# Patient Record
Sex: MALE | Race: WHITE
[De-identification: names, ages, dates, MRNs, and addresses within clinical notes are randomized per-mention and may not be internally consistent; named-entity substitution may affect disease eponyms.]

---

## 2017-07-05 ENCOUNTER — HOSPITAL ENCOUNTER (OUTPATIENT)
Dept: HOSPITAL 47 - RADCTMAIN | Age: 72
Discharge: HOME | End: 2017-07-05
Payer: MEDICARE

## 2017-07-05 DIAGNOSIS — J84.10: Primary | ICD-10-CM

## 2017-07-05 DIAGNOSIS — R59.0: ICD-10-CM

## 2017-07-05 PROCEDURE — 71250 CT THORAX DX C-: CPT

## 2017-07-05 NOTE — CT
EXAMINATION TYPE: CT chest wo con

 

DATE OF EXAM: 7/5/2017

 

COMPARISON: NONE

 

HISTORY: SOB

 

CT DLP: 590 mGycm

 

Unenhanced CT of the chest was performed with lung and mediastinal window settings submitted.  The la
ck of contrast limits evaluation of the vascular, mediastinal and parenchymal structures including th
e upper abdomen.

 

LUNGS: Pulmonary fibrosis seen within the upper mid and lower lung zones greatest at the lung bases. 
Calcified granuloma right upper lobe. No evidence for pulmonary mass or suspicious nodule. No evidenc
e for airspace consolidation. 

 

MEDIASTINUM/ROYCE:  Thoracic aorta is of normal caliber with limited evaluation given lack of contrast
.  The heart is not enlarged. There is AP window adenopathy measuring 1.4 cm short axis. Low right pa
ratracheal adenopathy measures 2.1 cm. Left paratracheal adenopathy measures 1.1 cm. Subcarinal adeno
kinsey measures 2.0 cm.

 

UPPER ABDOMEN:  No significant abnormality is seen.

 

OTHER: No significant other abnormality.

 

IMPRESSION:

 

1.  Angiopathic pulmonary fibrosis.

2. Nonspecific mediastinal adenopathy.

3. Remote granulomatous disease.

## 2018-12-03 ENCOUNTER — HOSPITAL ENCOUNTER (OUTPATIENT)
Dept: HOSPITAL 47 - RADCTMAIN | Age: 73
End: 2018-12-03
Payer: MEDICARE

## 2018-12-03 DIAGNOSIS — I70.0: ICD-10-CM

## 2018-12-03 DIAGNOSIS — R91.8: Primary | ICD-10-CM

## 2018-12-03 PROCEDURE — 71250 CT THORAX DX C-: CPT

## 2018-12-03 NOTE — CT
EXAMINATION TYPE: CT chest wo con

 

DATE OF EXAM: 12/3/2018

 

COMPARISON: 7/5/2017

 

HISTORY: pulmonary fibrosis, lung nodules

 

CT DLP: 628 mGycm.  Automated Exposure Control for Dose Reduction was Utilized.

 

TECHNIQUE:  CT scan of the thorax is performed without IV contrast.

 

FINDINGS:

 

LUNGS: Pulmonary fibrosis seen within the upper mid and lower lung zones greatest at the lung bases. 
Calcified granuloma right upper lobe. No evidence for pulmonary mass or suspicious nodule. No evidenc
e for airspace consolidation. 

 

MEDIASTINUM: Thoracic aorta is of normal caliber with limited evaluation given lack of contrast. The 
heart is not enlarged. There is AP window adenopathy measuring 1.4 cm short axis. Low right paratrach
eal adenopathy measures 2.1 cm. Left paratracheal adenopathy measures 1.1 cm. Subcarinal adenopathy m
easures 2.0 cm a dense coronary artery calcification noted there is atherosclerotic change of the aor
ta.

 

OTHER:  No additional significant abnormality is seen.

 

IMPRESSION: 

1. Findings suggest stable fairly advanced chronic interstitial lung disease. Correlate for idiopathi
c pulmonary fibrosis in the differential diagnosis.

2. Nonspecific intestinal adenopathy stable.

3. Rounded density along the upper margin of the left cardiac border has a rim of calcification appea
rs to be adjacent to or related to the left coronary artery. Although this may be partial volume aver
aging area adenopathy or possibly mediastinal cyst a contrast exam is recommended to exclude a corona
ry artery aneurysm. Finding is stable from the prior exam..

4. On the final image there is atherosclerotic change of the aorta. There is some density along the p
osterior midline which may be related to partial volume averaging and atherosclerotic changes, howeve
r, this finding also is recommended to BE correlated with postcontrast imaging to exclude a chronic d
issection. Findings relayed to the referring physician by telephone.

## 2018-12-07 ENCOUNTER — HOSPITAL ENCOUNTER (OUTPATIENT)
Dept: HOSPITAL 47 - RADCTMAIN | Age: 73
Discharge: HOME | End: 2018-12-07
Attending: INTERNAL MEDICINE
Payer: MEDICARE

## 2018-12-07 DIAGNOSIS — J98.4: Primary | ICD-10-CM

## 2018-12-07 LAB — BUN SERPL-SCNC: 18 MG/DL (ref 9–20)

## 2018-12-07 PROCEDURE — 84520 ASSAY OF UREA NITROGEN: CPT

## 2018-12-07 PROCEDURE — 82565 ASSAY OF CREATININE: CPT

## 2018-12-07 PROCEDURE — 36415 COLL VENOUS BLD VENIPUNCTURE: CPT

## 2018-12-07 PROCEDURE — 71260 CT THORAX DX C+: CPT

## 2018-12-07 NOTE — CT
EXAMINATION TYPE: CT chest w con

 

DATE OF EXAM: 12/7/2018

 

COMPARISON: 12/3/2018

 

HISTORY: Idiopathic pulumonary fibrosis.  Difficulty breathing.

 

CT DLP: 286.9 mGycm, Automated exposure control for dose reduction was used.

 

CONTRAST: Performed injected with 80 mL of Isovue 300.

 

TECHNIQUE: Axial images were obtained at 5 mm thick sections.  Reconstructed images are reviewed on [employer] computer in the coronal plane. 

 

FINDINGS: Portion of the thyroid visualized is normal.

 

Focal consolidation is not identified. There are scattered nonspecific increased lung markings may be
 related to pulmonary fibrosis. Mild peribronchial thickening may be present.

 

There is a peripheral calcification in the right midlung measuring 0.4 cm. Series 4.

 

 There is a large 1.8 cm density in the pretracheal space posterior superior vena cava. Enlarged lymp
h nodes be considered. There are additional smaller aortopulmonic window transverse dimension 1.0 cm.
 Subcentimeter lymph nodes are also at the aortopulmonic window. Enlarged subcarinal lymph nodes are 
present measuring approximately 1.3 cm in diameter. Mild-to-moderate coronary artery calcifications p
resent. The ascending aorta diameter at the level of the main pulmonary artery is 2.7 cm.  The main p
ulmonary artery diameter at the bifurcation is 2.4 cm.

 

Limited CT sections are obtained through the upper abdomen. Abdomen is essentially unremarkable.

 

IMPRESSIONS:

1. Diffuse increased lung markings can be compatible with a radiopaque pulmonary fibrosis.

2. Calcified densities in the posterior right midlung.

3. Enlarged pretracheal and aortopulmonic window lymph nodes.

## 2019-08-21 ENCOUNTER — HOSPITAL ENCOUNTER (INPATIENT)
Dept: HOSPITAL 47 - EC | Age: 74
LOS: 5 days | Discharge: HOME | DRG: 196 | End: 2019-08-26
Attending: FAMILY MEDICINE | Admitting: FAMILY MEDICINE
Payer: MEDICARE

## 2019-08-21 VITALS — BODY MASS INDEX: 23.8 KG/M2

## 2019-08-21 DIAGNOSIS — Z82.49: ICD-10-CM

## 2019-08-21 DIAGNOSIS — I25.10: ICD-10-CM

## 2019-08-21 DIAGNOSIS — Z79.84: ICD-10-CM

## 2019-08-21 DIAGNOSIS — Z87.891: ICD-10-CM

## 2019-08-21 DIAGNOSIS — F51.01: ICD-10-CM

## 2019-08-21 DIAGNOSIS — Z79.82: ICD-10-CM

## 2019-08-21 DIAGNOSIS — G47.30: ICD-10-CM

## 2019-08-21 DIAGNOSIS — J84.112: Primary | ICD-10-CM

## 2019-08-21 DIAGNOSIS — Z95.5: ICD-10-CM

## 2019-08-21 DIAGNOSIS — Z86.718: ICD-10-CM

## 2019-08-21 DIAGNOSIS — J44.1: ICD-10-CM

## 2019-08-21 DIAGNOSIS — Z95.1: ICD-10-CM

## 2019-08-21 DIAGNOSIS — Z79.899: ICD-10-CM

## 2019-08-21 DIAGNOSIS — Z79.02: ICD-10-CM

## 2019-08-21 DIAGNOSIS — J96.21: ICD-10-CM

## 2019-08-21 DIAGNOSIS — Z99.81: ICD-10-CM

## 2019-08-21 DIAGNOSIS — E11.9: ICD-10-CM

## 2019-08-21 DIAGNOSIS — I27.20: ICD-10-CM

## 2019-08-21 LAB
ALBUMIN SERPL-MCNC: 4.2 G/DL (ref 3.5–5)
ALP SERPL-CCNC: 84 U/L (ref 38–126)
ALT SERPL-CCNC: 30 U/L (ref 21–72)
ANION GAP SERPL CALC-SCNC: 12 MMOL/L
APTT BLD: 24.4 SEC (ref 22–30)
AST SERPL-CCNC: 25 U/L (ref 17–59)
BASOPHILS # BLD AUTO: 0 K/UL (ref 0–0.2)
BASOPHILS NFR BLD AUTO: 1 %
BUN SERPL-SCNC: 23 MG/DL (ref 9–20)
CALCIUM SPEC-MCNC: 9.8 MG/DL (ref 8.4–10.2)
CHLORIDE SERPL-SCNC: 104 MMOL/L (ref 98–107)
CO2 SERPL-SCNC: 24 MMOL/L (ref 22–30)
EOSINOPHIL # BLD AUTO: 0.1 K/UL (ref 0–0.7)
EOSINOPHIL NFR BLD AUTO: 2 %
ERYTHROCYTE [DISTWIDTH] IN BLOOD BY AUTOMATED COUNT: 4.87 M/UL (ref 4.3–5.9)
ERYTHROCYTE [DISTWIDTH] IN BLOOD: 15.9 % (ref 11.5–15.5)
GLUCOSE SERPL-MCNC: 95 MG/DL (ref 74–99)
HCT VFR BLD AUTO: 47.2 % (ref 39–53)
HGB BLD-MCNC: 15.8 GM/DL (ref 13–17.5)
INR PPP: 1 (ref ?–1.2)
LYMPHOCYTES # SPEC AUTO: 1.1 K/UL (ref 1–4.8)
LYMPHOCYTES NFR SPEC AUTO: 16 %
MAGNESIUM SPEC-SCNC: 2.2 MG/DL (ref 1.6–2.3)
MCH RBC QN AUTO: 32.4 PG (ref 25–35)
MCHC RBC AUTO-ENTMCNC: 33.5 G/DL (ref 31–37)
MCV RBC AUTO: 96.8 FL (ref 80–100)
MONOCYTES # BLD AUTO: 0.4 K/UL (ref 0–1)
MONOCYTES NFR BLD AUTO: 6 %
NEUTROPHILS # BLD AUTO: 5 K/UL (ref 1.3–7.7)
NEUTROPHILS NFR BLD AUTO: 75 %
PLATELET # BLD AUTO: 158 K/UL (ref 150–450)
POTASSIUM SERPL-SCNC: 4.4 MMOL/L (ref 3.5–5.1)
PROT SERPL-MCNC: 7.7 G/DL (ref 6.3–8.2)
PT BLD: 10.7 SEC (ref 9–12)
SODIUM SERPL-SCNC: 140 MMOL/L (ref 137–145)
WBC # BLD AUTO: 6.6 K/UL (ref 3.8–10.6)

## 2019-08-21 PROCEDURE — 83880 ASSAY OF NATRIURETIC PEPTIDE: CPT

## 2019-08-21 PROCEDURE — 71045 X-RAY EXAM CHEST 1 VIEW: CPT

## 2019-08-21 PROCEDURE — 96361 HYDRATE IV INFUSION ADD-ON: CPT

## 2019-08-21 PROCEDURE — 83036 HEMOGLOBIN GLYCOSYLATED A1C: CPT

## 2019-08-21 PROCEDURE — 80048 BASIC METABOLIC PNL TOTAL CA: CPT

## 2019-08-21 PROCEDURE — 80053 COMPREHEN METABOLIC PANEL: CPT

## 2019-08-21 PROCEDURE — 96374 THER/PROPH/DIAG INJ IV PUSH: CPT

## 2019-08-21 PROCEDURE — 36415 COLL VENOUS BLD VENIPUNCTURE: CPT

## 2019-08-21 PROCEDURE — 85025 COMPLETE CBC W/AUTO DIFF WBC: CPT

## 2019-08-21 PROCEDURE — 93005 ELECTROCARDIOGRAM TRACING: CPT

## 2019-08-21 PROCEDURE — 83605 ASSAY OF LACTIC ACID: CPT

## 2019-08-21 PROCEDURE — 94640 AIRWAY INHALATION TREATMENT: CPT

## 2019-08-21 PROCEDURE — 94760 N-INVAS EAR/PLS OXIMETRY 1: CPT

## 2019-08-21 PROCEDURE — 71250 CT THORAX DX C-: CPT

## 2019-08-21 PROCEDURE — 85610 PROTHROMBIN TIME: CPT

## 2019-08-21 PROCEDURE — 84484 ASSAY OF TROPONIN QUANT: CPT

## 2019-08-21 PROCEDURE — 83735 ASSAY OF MAGNESIUM: CPT

## 2019-08-21 PROCEDURE — 85730 THROMBOPLASTIN TIME PARTIAL: CPT

## 2019-08-21 PROCEDURE — 99285 EMERGENCY DEPT VISIT HI MDM: CPT

## 2019-08-21 RX ADMIN — CLOPIDOGREL BISULFATE SCH MG: 75 TABLET ORAL at 22:40

## 2019-08-21 RX ADMIN — IPRATROPIUM BROMIDE AND ALBUTEROL SULFATE SCH: .5; 3 SOLUTION RESPIRATORY (INHALATION) at 20:32

## 2019-08-21 RX ADMIN — CEFAZOLIN SCH MLS/HR: 330 INJECTION, POWDER, FOR SOLUTION INTRAMUSCULAR; INTRAVENOUS at 21:01

## 2019-08-21 RX ADMIN — ATORVASTATIN CALCIUM SCH MG: 40 TABLET, FILM COATED ORAL at 22:40

## 2019-08-21 RX ADMIN — METHYLPREDNISOLONE SODIUM SUCCINATE SCH MG: 125 INJECTION, POWDER, FOR SOLUTION INTRAMUSCULAR; INTRAVENOUS at 23:10

## 2019-08-21 NOTE — ED
SOB HPI





- General


Chief Complaint: Shortness of Breath


Stated Complaint: Weakness, Low O2


Time Seen by Provider: 08/21/19 15:16


Source: patient, RN notes reviewed, old records reviewed


Mode of arrival: wheelchair


Limitations: no limitations





- History of Present Illness


Initial Comments: 





This is a 74-year-old male the ER for evaluation.  Patient has severe history of

pulmonary fibrosis and severe lung disease is followed at [facility].  As well as of been running low lately symptoms in the 70s.  Patient 

denies any pain with sitting still he feels fine, went to get around or do any 

activity he gets severely short of breath.  Patient cannot do much activity at 

home with activities of daily living or shortly diminished.  Patient denying 

current chest pain no fevers.  Mild cough significant congestion.  No travel 

history no sick contacts


MD Complaint: shortness of breath, cough


-: days(s)


Severity: moderate


Severity scale (1-10): 4


Quality: aching


Consistency: constant


Improves With: oxygen, rest, bronchodilators, medication


Worsens With: exertion


Known History Of: COPD (Pulmonary fibrosis)


Context: recent URI


Associated Symptoms: cough


Treatments Prior to Arrival: none





- Related Data


                                Home Medications











 Medication  Instructions  Recorded  Confirmed


 


Aspirin 81 mg PO DAILY 08/05/16 08/21/19


 


Atorvastatin [Lipitor] 40 mg PO DAILY 08/05/16 08/21/19


 


Clopidogrel [Plavix] 75 mg PO DAILY 08/05/16 08/21/19


 


Glimepiride [Amaryl] 4 mg PO BID 08/05/16 08/21/19


 


Isosorbide Mononitrate [Imdur] 120 mg PO DAILY 08/05/16 08/21/19


 


Ranolazine [Ranexa] 1,000 mg PO BID 08/05/16 08/21/19


 


sitaGLIPtin [Januvia] 100 mg PO DAILY 08/05/16 08/21/19


 


Fluticasone Nasal Spray [Flonase 2 spr EA NOSTRIL DAILY 08/21/19 08/21/19





Nasal Spray]   


 


Midodrine [ProAmatine] 5 mg PO BID 08/21/19 08/21/19


 


Natures Bounty Cinnimon Plus 1 cap PO DAILY 08/21/19 08/21/19





Chromium 2000mg   











                                    Allergies











Allergy/AdvReac Type Severity Reaction Status Date / Time


 


No Known Allergies Allergy   Verified 08/21/19 15:51














Review of Systems


ROS Statement: 


Those systems with pertinent positive or pertinent negative responses have been 

documented in the HPI.





ROS Other: All systems not noted in ROS Statement are negative.





Past Medical History


Past Medical History: Diabetes Mellitus, Deep Vein Thrombosis (DVT), Sleep 

Apnea/CPAP/BIPAP


Additional Past Medical History / Comment(s): see Dr Jaime H & P, supposed to 

use CPAP, DVT years ago after a surgery, loses balance & gets dizzy,pulmonary 

fibrosis ,pulmonary htn


History of Any Multi-Drug Resistant Organisms: None Reported


Past Surgical History: Appendectomy, Coronary Bypass/CABG, Heart Catheterization

 With Stent, Orthopedic Surgery


Additional Past Surgical History / Comment(s): CABG x2, has 13 stents, finger 

surg.


Past Anesthesia/Blood Transfusion Reactions: No Reported Reaction


Date of Last Stent Placement:: 2014


Past Psychological History: No Psychological Hx Reported


Smoking Status: Former smoker


Past Alcohol Use History: None Reported


Past Drug Use History: None Reported





- Past Family History


  ** Mother


Family Medical History: No Reported History





General Exam


Limitations: no limitations


General appearance: alert, in no apparent distress


Head exam: Present: atraumatic, normocephalic, normal inspection


Eye exam: Present: normal appearance, PERRL, EOMI.  Absent: scleral icterus, 

conjunctival injection, periorbital swelling


ENT exam: Present: normal exam, mucous membranes moist


Neck exam: Present: normal inspection.  Absent: tenderness, meningismus, 

lymphadenopathy


Respiratory exam: Present: wheezes, decreased breath sounds, prolonged 

expiratory.  Absent: respiratory distress, rales, rhonchi, stridor


Cardiovascular Exam: Present: regular rate, normal rhythm, normal heart sounds. 

 Absent: systolic murmur, diastolic murmur, rubs, gallop, clicks


GI/Abdominal exam: Present: soft, normal bowel sounds.  Absent: distended, 

tenderness, guarding, rebound, rigid


Extremities exam: Present: normal inspection, full ROM, normal capillary refill.

  Absent: tenderness, pedal edema, joint swelling, calf tenderness


Back exam: Present: normal inspection


Neurological exam: Present: alert, oriented X3, CN II-XII intact


Psychiatric exam: Present: normal affect, normal mood


Skin exam: Present: warm, dry, intact, normal color.  Absent: rash





Course


                                   Vital Signs











  08/21/19 08/21/19 08/21/19





  15:05 15:51 16:01


 


Temperature 97.9 F  


 


Pulse Rate 76 60 67


 


Respiratory 26 H  





Rate   


 


Blood Pressure 138/77  


 


O2 Sat by Pulse 82 L  





Oximetry   














  08/21/19





  16:31


 


Temperature 


 


Pulse Rate 78


 


Respiratory 





Rate 


 


Blood Pressure 


 


O2 Sat by Pulse 





Oximetry 














- Reevaluation(s)


Reevaluation #1: 





08/21/19 18:01


Medical records reviewed


Reevaluation #2: 





08/21/19 18:01


Patient states she is without significant complaint





Medical Decision Making





- Medical Decision Making





74 male the ER for evaluation presents today for evaluation regards to breath 

hypoxia home.  Patient feels better here in the ER with exertion symptoms 

significantly increased.  Patient be admitted for continued breathing treatments





- Lab Data


Result diagrams: 


                                 08/21/19 15:40





                                 08/21/19 15:40


                                   Lab Results











  08/21/19 08/21/19 08/21/19 Range/Units





  15:40 15:40 15:40 


 


WBC  6.6    (3.8-10.6)  k/uL


 


RBC  4.87    (4.30-5.90)  m/uL


 


Hgb  15.8    (13.0-17.5)  gm/dL


 


Hct  47.2    (39.0-53.0)  %


 


MCV  96.8    (80.0-100.0)  fL


 


MCH  32.4    (25.0-35.0)  pg


 


MCHC  33.5    (31.0-37.0)  g/dL


 


RDW  15.9 H    (11.5-15.5)  %


 


Plt Count  158    (150-450)  k/uL


 


Neutrophils %  75    %


 


Lymphocytes %  16    %


 


Monocytes %  6    %


 


Eosinophils %  2    %


 


Basophils %  1    %


 


Neutrophils #  5.0    (1.3-7.7)  k/uL


 


Lymphocytes #  1.1    (1.0-4.8)  k/uL


 


Monocytes #  0.4    (0-1.0)  k/uL


 


Eosinophils #  0.1    (0-0.7)  k/uL


 


Basophils #  0.0    (0-0.2)  k/uL


 


PT    10.7  (9.0-12.0)  sec


 


INR    1.0  (<1.2)  


 


APTT    24.4  (22.0-30.0)  sec


 


Sodium   140   (137-145)  mmol/L


 


Potassium   4.4   (3.5-5.1)  mmol/L


 


Chloride   104   ()  mmol/L


 


Carbon Dioxide   24   (22-30)  mmol/L


 


Anion Gap   12   mmol/L


 


BUN   23 H   (9-20)  mg/dL


 


Creatinine   1.18   (0.66-1.25)  mg/dL


 


Est GFR (CKD-EPI)AfAm   69   (>60 ml/min/1.73 sqM)  


 


Est GFR (CKD-EPI)NonAf   60   (>60 ml/min/1.73 sqM)  


 


Glucose   95   (74-99)  mg/dL


 


Calcium   9.8   (8.4-10.2)  mg/dL


 


Magnesium   2.2   (1.6-2.3)  mg/dL


 


Total Bilirubin   0.9   (0.2-1.3)  mg/dL


 


AST   25   (17-59)  U/L


 


ALT   30   (21-72)  U/L


 


Alkaline Phosphatase   84   ()  U/L


 


Troponin I     (0.000-0.034)  ng/mL


 


Total Protein   7.7   (6.3-8.2)  g/dL


 


Albumin   4.2   (3.5-5.0)  g/dL














  08/21/19 Range/Units





  15:40 


 


WBC   (3.8-10.6)  k/uL


 


RBC   (4.30-5.90)  m/uL


 


Hgb   (13.0-17.5)  gm/dL


 


Hct   (39.0-53.0)  %


 


MCV   (80.0-100.0)  fL


 


MCH   (25.0-35.0)  pg


 


MCHC   (31.0-37.0)  g/dL


 


RDW   (11.5-15.5)  %


 


Plt Count   (150-450)  k/uL


 


Neutrophils %   %


 


Lymphocytes %   %


 


Monocytes %   %


 


Eosinophils %   %


 


Basophils %   %


 


Neutrophils #   (1.3-7.7)  k/uL


 


Lymphocytes #   (1.0-4.8)  k/uL


 


Monocytes #   (0-1.0)  k/uL


 


Eosinophils #   (0-0.7)  k/uL


 


Basophils #   (0-0.2)  k/uL


 


PT   (9.0-12.0)  sec


 


INR   (<1.2)  


 


APTT   (22.0-30.0)  sec


 


Sodium   (137-145)  mmol/L


 


Potassium   (3.5-5.1)  mmol/L


 


Chloride   ()  mmol/L


 


Carbon Dioxide   (22-30)  mmol/L


 


Anion Gap   mmol/L


 


BUN   (9-20)  mg/dL


 


Creatinine   (0.66-1.25)  mg/dL


 


Est GFR (CKD-EPI)AfAm   (>60 ml/min/1.73 sqM)  


 


Est GFR (CKD-EPI)NonAf   (>60 ml/min/1.73 sqM)  


 


Glucose   (74-99)  mg/dL


 


Calcium   (8.4-10.2)  mg/dL


 


Magnesium   (1.6-2.3)  mg/dL


 


Total Bilirubin   (0.2-1.3)  mg/dL


 


AST   (17-59)  U/L


 


ALT   (21-72)  U/L


 


Alkaline Phosphatase   ()  U/L


 


Troponin I  <0.012  (0.000-0.034)  ng/mL


 


Total Protein   (6.3-8.2)  g/dL


 


Albumin   (3.5-5.0)  g/dL














- EKG Data


-: EKG Interpreted by Me (EKG shows sinus rhythm rate of 74, , QRS 90, QTc

 452)





- Radiology Data


Radiology results: report reviewed (Chest x-rays negative for acute disease), 

image reviewed





Disposition


Clinical Impression: 


 Acute exacerbation of chronic obstructive airways disease, Pulmonary fibrosis





Disposition: ADMITTED AS IP TO THIS HOSP


Condition: Fair


Is patient prescribed a controlled substance at d/c from ED?: No


Referrals: 


René Gong MD [Primary Care Provider] - 1-2 days

## 2019-08-21 NOTE — XR
EXAMINATION TYPE: XR chest 1V portable

 

DATE OF EXAM: 8/21/2019

 

HISTORY: Shortness of breath.

 

COMPARISON: None.

 

TECHNIQUE: Single view of the chest is submitted.

 

FINDINGS:

Demonstrated are scattered senescent parenchymal change.  Scattered fibrotic change noted.

 

There is no evidence for focal infiltrate. 

 

The heart is  is enlarged.

 

Hilar and mediastinal structures are within normal limits.  

 

Degenerative changes are seen of the dorsal spine. 

 

 IMPRESSION: 

 

1.  Chronic changes without evidence for acute pulmonary disease.

## 2019-08-22 LAB
GLUCOSE BLD-MCNC: 208 MG/DL (ref 75–99)
GLUCOSE BLD-MCNC: 233 MG/DL (ref 75–99)
GLUCOSE BLD-MCNC: 300 MG/DL (ref 75–99)
HBA1C MFR BLD: 6.1 % (ref 4–6)

## 2019-08-22 RX ADMIN — GLIMEPIRIDE SCH MG: 4 TABLET ORAL at 20:22

## 2019-08-22 RX ADMIN — INSULIN ASPART SCH UNIT: 100 INJECTION, SOLUTION INTRAVENOUS; SUBCUTANEOUS at 18:04

## 2019-08-22 RX ADMIN — LINAGLIPTIN SCH MG: 5 TABLET, FILM COATED ORAL at 09:01

## 2019-08-22 RX ADMIN — NITROGLYCERIN PRN MG: 0.4 TABLET SUBLINGUAL at 14:20

## 2019-08-22 RX ADMIN — ISOSORBIDE MONONITRATE SCH MG: 60 TABLET, EXTENDED RELEASE ORAL at 09:00

## 2019-08-22 RX ADMIN — ASPIRIN 81 MG CHEWABLE TABLET SCH MG: 81 TABLET CHEWABLE at 09:01

## 2019-08-22 RX ADMIN — RANOLAZINE SCH MG: 500 TABLET, FILM COATED, EXTENDED RELEASE ORAL at 20:23

## 2019-08-22 RX ADMIN — METHYLPREDNISOLONE SODIUM SUCCINATE SCH MG: 125 INJECTION, POWDER, FOR SOLUTION INTRAMUSCULAR; INTRAVENOUS at 17:57

## 2019-08-22 RX ADMIN — MIDODRINE HYDROCHLORIDE SCH MG: 5 TABLET ORAL at 09:01

## 2019-08-22 RX ADMIN — CEFAZOLIN SCH MLS/HR: 330 INJECTION, POWDER, FOR SOLUTION INTRAMUSCULAR; INTRAVENOUS at 15:34

## 2019-08-22 RX ADMIN — MIDODRINE HYDROCHLORIDE SCH MG: 5 TABLET ORAL at 17:58

## 2019-08-22 RX ADMIN — METHYLPREDNISOLONE SODIUM SUCCINATE SCH MG: 125 INJECTION, POWDER, FOR SOLUTION INTRAMUSCULAR; INTRAVENOUS at 06:04

## 2019-08-22 RX ADMIN — NITROGLYCERIN PRN MG: 0.4 TABLET SUBLINGUAL at 14:00

## 2019-08-22 RX ADMIN — CEFAZOLIN SCH MLS/HR: 330 INJECTION, POWDER, FOR SOLUTION INTRAMUSCULAR; INTRAVENOUS at 23:44

## 2019-08-22 RX ADMIN — INSULIN ASPART SCH: 100 INJECTION, SOLUTION INTRAVENOUS; SUBCUTANEOUS at 12:31

## 2019-08-22 RX ADMIN — INSULIN ASPART SCH UNIT: 100 INJECTION, SOLUTION INTRAVENOUS; SUBCUTANEOUS at 20:27

## 2019-08-22 RX ADMIN — RANOLAZINE SCH MG: 500 TABLET, FILM COATED, EXTENDED RELEASE ORAL at 09:01

## 2019-08-22 RX ADMIN — METHYLPREDNISOLONE SODIUM SUCCINATE SCH MG: 125 INJECTION, POWDER, FOR SOLUTION INTRAMUSCULAR; INTRAVENOUS at 23:44

## 2019-08-22 RX ADMIN — BUDESONIDE SCH MG: 0.5 INHALANT ORAL at 20:36

## 2019-08-22 RX ADMIN — IPRATROPIUM BROMIDE AND ALBUTEROL SULFATE SCH ML: .5; 3 SOLUTION RESPIRATORY (INHALATION) at 11:06

## 2019-08-22 RX ADMIN — NITROGLYCERIN PRN MG: 0.4 TABLET SUBLINGUAL at 12:53

## 2019-08-22 RX ADMIN — IPRATROPIUM BROMIDE AND ALBUTEROL SULFATE SCH ML: .5; 3 SOLUTION RESPIRATORY (INHALATION) at 20:36

## 2019-08-22 RX ADMIN — ATORVASTATIN CALCIUM SCH MG: 40 TABLET, FILM COATED ORAL at 20:22

## 2019-08-22 RX ADMIN — CEFAZOLIN SCH MLS/HR: 330 INJECTION, POWDER, FOR SOLUTION INTRAMUSCULAR; INTRAVENOUS at 05:41

## 2019-08-22 RX ADMIN — FLUTICASONE PROPIONATE SCH SPRAY: 50 SPRAY, METERED NASAL at 08:59

## 2019-08-22 RX ADMIN — GLIMEPIRIDE SCH MG: 4 TABLET ORAL at 09:01

## 2019-08-22 RX ADMIN — IPRATROPIUM BROMIDE AND ALBUTEROL SULFATE SCH ML: .5; 3 SOLUTION RESPIRATORY (INHALATION) at 07:02

## 2019-08-22 RX ADMIN — IPRATROPIUM BROMIDE AND ALBUTEROL SULFATE SCH ML: .5; 3 SOLUTION RESPIRATORY (INHALATION) at 15:52

## 2019-08-22 RX ADMIN — CLOPIDOGREL BISULFATE SCH MG: 75 TABLET ORAL at 20:22

## 2019-08-22 NOTE — CT
EXAMINATION TYPE: CT chest wo con

 

DATE OF EXAM: 8/22/2019

 

COMPARISON: 12/7/2018

 

HISTORY: Pulmonary fibrosis.

 

CT DLP: 438 mGycm.  Automated Exposure Control for Dose Reduction was Utilized.

 

TECHNIQUE:  CT scan of the thorax is performed without IV contrast.

 

FINDINGS:

 

There is diffuse reticular peripheral infiltrate in both lungs. Thoracic aorta is atheromatous. There
 is extensive coronary artery calcification. There is no aortic aneurysm. There are no hilar masses. 
There are multiple enlarged mediastinal lymph nodes. Largest measures 2.5 cm. There is no pleural eff
usion. There is no adrenal mass. Upper abdominal soft tissues are intact.

 

There is spurring in the thoracic spine. There is no compression fracture. There are sternal wires. B
claudette thorax is intact.

IMPRESSION: Extensive pulmonary interstitial fibrosis. Mediastinal adenopathy. Lung disease show some
 progression compared to old CT scan. Mediastinal adenopathy unchanged. Extensive atherosclerotic vas
cular disease.

## 2019-08-22 NOTE — P.HPIM
History of Present Illness


H&P Date: 08/22/19


Chief Complaint: Hypoxia





This is a history and physical on a 74-year-old white male with known history of

diabetes who struggles with pulmonary fibrosis.  The patient has been taking 

Esbriet recently but has not been able to tolerate the medication.  He is now 

admitted for significant dyspnea on exertion.  Pulse oximetry would drop into 

the low 80s.  He is now on 4 L and has been placed on COPD type treatment.  No 

sniffing chest pain or nausea area no voiding difficulties are stated.  The 

patient is former smoker.





Review of Systems


Constitutional: Denies chills, Denies fever


Eyes: denies blurred vision, denies pain


Cardiovascular: Denies chest pain, Denies shortness of breath


Respiratory: Reports dyspnea, Reports home oxygen


Gastrointestinal: Denies abdominal pain, Denies diarrhea, Denies nausea, Denies 

vomiting


Musculoskeletal: Denies as per HPI, Denies myalgias


Integumentary: Denies pruritus, Denies rash


Neurological: Denies numbness, Denies weakness


Endocrine: Denies fatigue, Denies weight change





Past Medical History


Past Medical History: Diabetes Mellitus, Deep Vein Thrombosis (DVT), Sleep 

Apnea/CPAP/BIPAP


Additional Past Medical History / Comment(s): supposed to use CPAP, DVT years 

ago after a surgery, loses balance & gets dizzy in past ,pulmonary fibrosis 

,pulmonary htn


History of Any Multi-Drug Resistant Organisms: None Reported


Past Surgical History: Appendectomy, Coronary Bypass/CABG, Heart Catheterization

With Stent, Orthopedic Surgery


Additional Past Surgical History / Comment(s): CABG x2, has 13 stents, finger 

surg right index amputated,


Past Anesthesia/Blood Transfusion Reactions: No Reported Reaction


Date of Last Stent Placement:: 2014


Past Psychological History: No Psychological Hx Reported


Additional Psychological History / Comment(s): Lives with wife. Lives in ranch 

home and one step. Has home oxygen, and glucometer.


Smoking Status: Former smoker


Past Alcohol Use History: Occasional


Additional Past Alcohol Use History / Comment(s): quit smoking 1980, smoked for 

20 yrs.


Past Drug Use History: None Reported





- Past Family History


  ** Mother


Family Medical History: Myocardial Infarction (MI)


Additional Family Medical History / Comment(s): Heart problems





Medications and Allergies


                                Home Medications











 Medication  Instructions  Recorded  Confirmed  Type


 


Aspirin 81 mg PO DAILY 08/05/16 08/21/19 History


 


Atorvastatin [Lipitor] 40 mg PO HS 08/05/16 08/21/19 History


 


Clopidogrel [Plavix] 75 mg PO HS 08/05/16 08/21/19 History


 


Glimepiride [Amaryl] 4 mg PO BID 08/05/16 08/21/19 History


 


Isosorbide Mononitrate [Imdur] 120 mg PO DAILY 08/05/16 08/21/19 History


 


Ranolazine [Ranexa] 1,000 mg PO BID 08/05/16 08/21/19 History


 


sitaGLIPtin [Januvia] 100 mg PO DAILY 08/05/16 08/21/19 History


 


Fluticasone Nasal Spray [Flonase 2 spr EA NOSTRIL DAILY 08/21/19 08/21/19 

History





Nasal Spray]    


 


Midodrine [ProAmatine] 5 mg PO BID 08/21/19 08/21/19 History


 


Natures Bounty Cinnimon Plus 1 cap PO DAILY 08/21/19 08/21/19 History





Chromium 2000mg    








                                    Allergies











Allergy/AdvReac Type Severity Reaction Status Date / Time


 


No Known Allergies Allergy   Verified 08/21/19 15:51














Physical Exam


Vitals: 


                                   Vital Signs











  Temp Pulse Pulse Pulse Resp BP BP


 


 08/22/19 07:14   69    18  


 


 08/22/19 07:02   68     


 


 08/22/19 05:00  97.3 F L   65   18   126/70


 


 08/21/19 21:39  97.5 F L    64  18   142/47


 


 08/21/19 18:19   63    18  161/82 


 


 08/21/19 16:31   78     


 


 08/21/19 16:01   67     


 


 08/21/19 15:51   60     


 


 08/21/19 15:05  97.9 F  76    26 H  138/77 














  Pulse Ox


 


 08/22/19 07:14 


 


 08/22/19 07:02  94 L


 


 08/22/19 05:00  94 L


 


 08/21/19 21:39  93 L


 


 08/21/19 18:19  93 L


 


 08/21/19 16:31 


 


 08/21/19 16:01 


 


 08/21/19 15:51 


 


 08/21/19 15:05  82 L








                                Intake and Output











 08/21/19 08/22/19 08/22/19





 22:59 06:59 14:59


 


Intake Total 240 840 


 


Output Total 800  


 


Balance -560 840 


 


Intake:   


 


  Intake, IV Titration  600 





  Amount   


 


    Sodium Chloride 0.9% 1,  600 





    000 ml @ 100 mls/hr IV .   





    Q10H [facility] Rx#:[id number]   


 


  Oral 240 240 


 


Output:   


 


  Urine 800  


 


Other:   


 


  Voiding Method  Urinal 


 


  # Voids  2 


 


  Weight 69.082 kg  














- Constitutional


General appearance: no acute distress





- EENT


Eyes: EOMI





- Neck


Neck: no lymphadenopathy





- Respiratory


Respiratory: bilateral: diminished





- Cardiovascular


Rhythm: regular


Heart sounds: normal: S1, S2


Abnormal Heart Sounds: no S3 Gallop





- Gastrointestinal


General gastrointestinal: soft, no tenderness





- Neurologic


Neurologic: CNII-XII intact





- Musculoskeletal


Musculoskeletal: generalized weakness





- Psychiatric


Psychiatric: A&O x's 3, appropriate affect, intact judgment & insight





Results


CBC & Chem 7: 


                                 08/21/19 15:40





                                 08/21/19 15:40


Labs: 


                  Abnormal Lab Results - Last 24 Hours (Table)











  08/21/19 08/21/19 Range/Units





  15:40 15:40 


 


RDW  15.9 H   (11.5-15.5)  %


 


BUN   23 H  (9-20)  mg/dL














Thrombosis Risk Factor Assmnt





- Choose All That Apply


Each Factor Represents 1 point: Swollen legs (current)


Each Risk Factor Represents 3 Points: Age 75 years or older


Thrombosis Risk Factor Assessment Total Risk Factor Score: 4


Thrombosis Risk Factor Assessment Level: Moderate Risk





Assessment and Plan


(1) Diabetes


Current Visit: Yes   Status: Acute   Code(s): E11.9 - TYPE 2 DIABETES MELLITUS 

WITHOUT COMPLICATIONS   SNOMED Code(s): 65107587


   





(2) CAD (coronary artery disease)


Current Visit: Yes   Status: Acute   Code(s): I25.10 - ATHSCL HEART DISEASE OF 

NATIVE CORONARY ARTERY W/O ANG PCTRS   SNOMED Code(s): 34635316


   





(3) Acute exacerbation of chronic obstructive airways disease


Current Visit: Yes   Status: Acute   Code(s): J44.1 - CHRONIC OBSTRUCTIVE 

PULMONARY DISEASE W (ACUTE) EXACERBATION   SNOMED Code(s): 725739015


   





(4) Pulmonary fibrosis


Current Visit: Yes   Status: Acute   Code(s): J84.10 - PULMONARY FIBROSIS, 

UNSPECIFIED   SNOMED Code(s): 88568758


   


Plan: 





Continue Solu-Medrol at this time.  We'll wean dose.





Primary insomnia treatment.





Await pulmonology consultation.





Prognosis is guarded secondary to his pulmonary fibrosis.





Anticipate discharge in next 24-48 hours.


Time with Patient: Greater than 30

## 2019-08-23 LAB
GLUCOSE BLD-MCNC: 204 MG/DL (ref 75–99)
GLUCOSE BLD-MCNC: 227 MG/DL (ref 75–99)
GLUCOSE BLD-MCNC: 233 MG/DL (ref 75–99)
GLUCOSE BLD-MCNC: 276 MG/DL (ref 75–99)

## 2019-08-23 RX ADMIN — RANOLAZINE SCH MG: 500 TABLET, FILM COATED, EXTENDED RELEASE ORAL at 21:18

## 2019-08-23 RX ADMIN — INSULIN ASPART SCH UNIT: 100 INJECTION, SOLUTION INTRAVENOUS; SUBCUTANEOUS at 17:40

## 2019-08-23 RX ADMIN — FLUTICASONE PROPIONATE SCH SPRAY: 50 SPRAY, METERED NASAL at 08:14

## 2019-08-23 RX ADMIN — METHYLPREDNISOLONE SODIUM SUCCINATE SCH MG: 125 INJECTION, POWDER, FOR SOLUTION INTRAMUSCULAR; INTRAVENOUS at 21:18

## 2019-08-23 RX ADMIN — ASPIRIN 81 MG CHEWABLE TABLET SCH MG: 81 TABLET CHEWABLE at 08:13

## 2019-08-23 RX ADMIN — RANOLAZINE SCH MG: 500 TABLET, FILM COATED, EXTENDED RELEASE ORAL at 08:16

## 2019-08-23 RX ADMIN — MIDODRINE HYDROCHLORIDE SCH MG: 5 TABLET ORAL at 16:13

## 2019-08-23 RX ADMIN — CLOPIDOGREL BISULFATE SCH MG: 75 TABLET ORAL at 21:18

## 2019-08-23 RX ADMIN — MIDODRINE HYDROCHLORIDE SCH MG: 5 TABLET ORAL at 08:14

## 2019-08-23 RX ADMIN — METHYLPREDNISOLONE SODIUM SUCCINATE SCH MG: 125 INJECTION, POWDER, FOR SOLUTION INTRAMUSCULAR; INTRAVENOUS at 16:13

## 2019-08-23 RX ADMIN — IPRATROPIUM BROMIDE AND ALBUTEROL SULFATE SCH ML: .5; 3 SOLUTION RESPIRATORY (INHALATION) at 19:25

## 2019-08-23 RX ADMIN — INSULIN ASPART SCH UNIT: 100 INJECTION, SOLUTION INTRAVENOUS; SUBCUTANEOUS at 21:27

## 2019-08-23 RX ADMIN — BUDESONIDE SCH MG: 0.5 INHALANT ORAL at 08:29

## 2019-08-23 RX ADMIN — CEFAZOLIN SCH: 330 INJECTION, POWDER, FOR SOLUTION INTRAMUSCULAR; INTRAVENOUS at 23:42

## 2019-08-23 RX ADMIN — GLIMEPIRIDE SCH MG: 4 TABLET ORAL at 08:15

## 2019-08-23 RX ADMIN — METHYLPREDNISOLONE SODIUM SUCCINATE SCH MG: 125 INJECTION, POWDER, FOR SOLUTION INTRAMUSCULAR; INTRAVENOUS at 08:13

## 2019-08-23 RX ADMIN — INSULIN ASPART SCH UNIT: 100 INJECTION, SOLUTION INTRAVENOUS; SUBCUTANEOUS at 12:28

## 2019-08-23 RX ADMIN — BUDESONIDE SCH MG: 0.5 INHALANT ORAL at 19:25

## 2019-08-23 RX ADMIN — ISOSORBIDE MONONITRATE SCH MG: 60 TABLET, EXTENDED RELEASE ORAL at 08:15

## 2019-08-23 RX ADMIN — ATORVASTATIN CALCIUM SCH MG: 40 TABLET, FILM COATED ORAL at 21:18

## 2019-08-23 RX ADMIN — IPRATROPIUM BROMIDE AND ALBUTEROL SULFATE SCH ML: .5; 3 SOLUTION RESPIRATORY (INHALATION) at 11:35

## 2019-08-23 RX ADMIN — CEFAZOLIN SCH MLS/HR: 330 INJECTION, POWDER, FOR SOLUTION INTRAMUSCULAR; INTRAVENOUS at 11:28

## 2019-08-23 RX ADMIN — LINAGLIPTIN SCH MG: 5 TABLET, FILM COATED ORAL at 08:15

## 2019-08-23 RX ADMIN — IPRATROPIUM BROMIDE AND ALBUTEROL SULFATE SCH ML: .5; 3 SOLUTION RESPIRATORY (INHALATION) at 15:25

## 2019-08-23 RX ADMIN — IPRATROPIUM BROMIDE AND ALBUTEROL SULFATE SCH ML: .5; 3 SOLUTION RESPIRATORY (INHALATION) at 08:29

## 2019-08-23 RX ADMIN — INSULIN ASPART SCH UNIT: 100 INJECTION, SOLUTION INTRAVENOUS; SUBCUTANEOUS at 08:13

## 2019-08-23 RX ADMIN — GLIMEPIRIDE SCH MG: 4 TABLET ORAL at 21:18

## 2019-08-23 NOTE — CONS
CONSULTATION



Carl Singh is a 74-year-old male who presented to [facility] to the ER

with increasing shortness of breath.  He was seen after I discussed with him and his

wife and received his permission to see him.  He has a history of lung fibrosis,

initially he was treated with _____.  He developed weight loss along with severe

diarrhea and this had to be discontinued.  He was subsequently seen at the [facility] and was being followed there.  His next appointment there is February the

4th.  They do not have him on any other medications to slow the progression of his

interstitial lung disease.  He clinically was diagnosed to have idiopathic pulmonary

fibrosis.  He has been having severe shortness of breath.  His oxygen saturation drops

into the 70s on minimal exertion.  He came to the ER for further evaluation and

management.



PAST MEDICAL HISTORY:

Positive for idiopathic pulmonary fibrosis, history of DVT, history of obstructive

sleep apnea for which he uses a CPAP, history of pulmonary hypertension, coronary

artery disease, coronary artery bypass, previous cardiac cath with stent placement.



FAMILY HISTORY:

Family history is noncontributory.



SOCIAL HISTORY:

Patient is a former smoker.  He does not drink alcohol excessively.  He was not exposed

to asbestos.



MEDICATIONS:

His medications prior to admission were Plavix, Lipitor, Flonase, Ranexa, ProAmatine,

Amaryl, Nature's bounty cinnamon plus chromium, aspirin, Januvia, and Imdur.



REVIEW OF SYSTEMS:

Review of systems is noncontributory.



PHYSICAL EXAMINATION:

On physical examination, patient is lying in bed.  He is short of breath in mild

respiratory distress.  His respiratory rate is 16, pulse rate 81, temperature 96.7, O2

saturation on 4 L by nasal cannula is 92%, but on minimal movement he goes done into

80. Blood pressure is 118/63.

HEENT reveals pupils are equal.  There is prominence of his jugular veins.

Chest reveals bilateral Velcro type crackles.

Cardiovascular system reveals an S1, S2.  No S3, no S4.

Abdomen is soft.

There is 1+ to 2+ pedal edema.



LABS:

Labs reveal a lactic acid of 5.9, white count of 6.6, hemoglobin of 15.8, sodium 140,

potassium 4.4, chloride 104, bicarb 24, BUN 23, creatinine 1.18.  NT proBNP is 1160.

Albumin is 4.2.  CT scan of the chest was done which showed evidence of bilateral

reticular infiltrates consistent with IPF with some adenopathy.  This is slightly worse

from his previous CT.



IMPRESSION AT THIS TIME:

1. Idiopathic pulmonary fibrosis with what seems to be an acute exacerbation.

2. Possible chronic obstructive pulmonary disease.

3. Coronary artery disease.

4. Hypoxemia, primarily due to idiopathic pulmonary fibrosis with lactate due to

    hypoxemia.

At this point in time from a pulmonary standpoint, would keep the patient on IV

steroids, insulin, bronchodilators and GI and DVT prophylaxis. Consideration by the

family is being study.  Consideration is being given for transfer to the [facility] where his pulmonologist is per family and his request. This is being arranged

at this time. In the interim, would continue him on his current medications including

the steroids. Add inhaled steroids to his regimen.  We will follow him closely during

his hospital stay and in the outpatient setting he would benefit from pulmonary rehab

and possibly from starting him on Ofev.  Depending on how he does, we shall make

further changes to his care.





ROHIT / ALEX: [id number] / Job#: [id number]

## 2019-08-23 NOTE — PN
PROGRESS NOTE



DATE OF SERVICE:

08/23/2019



He was seen again on 08/23/2019.  He seems less short of breath.  He is not using

accessory muscles of respiration.  He continues to remain hypoxic on minimal exertion.



PHYSICAL EXAMINATION:

His blood pressure 116/56, respiratory rate of 18, pulse of 80, temperature 96.9, O2

saturation on 4 L by nasal cannula is 93%.  HEENT:  Unremarkable. Chest reveals

bilateral Velcro type crackles.  Cardiovascular system is S1, S2.  Abdomen is soft.

There is trace pedal edema.



There are no new labs except for sugars that remain in the 200s.  CT scan of the chest

showed evidence of bilateral changes consistent with pulmonary fibrosis.



IMPRESSION:

Idiopathic pulmonary fibrosis with an acute exacerbation.  Continue IV steroids,

aerosolized steroids.  Control his blood sugars.



Per the patient's request and the family's request,  he is being transferred to the

[facility] once a bed is available.  His prognosis is guarded.  He

would benefit from outpatient workup and pulmonary rehab as an outpatient.  He may need

to be started potentially on Ofev which he has not been on in the past.  He was

counseled regarding his condition in the presence of his sisters.





MMODL / IJN: [id number] / Job#: [id number]

## 2019-08-23 NOTE — P.DS
Providers


Date of admission: 


08/21/19 17:57





Attending physician: 


René Gong





Consults: 





                                        





08/22/19 08:29


Consult Physician Routine 


   Consulting Provider: Rosana Travis


   Consult Reason/Comments: Pulmonary fibrosis


   Do you want consulting provider notified?: Already Contacted











Primary care physician: 


René Gong








- Discharge Diagnosis(es)


(1) Diabetes


Current Visit: Yes   Status: Acute   





(2) CAD (coronary artery disease)


Current Visit: Yes   Status: Acute   





(3) Acute exacerbation of chronic obstructive airways disease


Current Visit: Yes   Status: Acute   





(4) Pulmonary fibrosis


Current Visit: Yes   Status: Acute   


Hospital Course: 





This is a discharge summary on a 74-year-old white male essentially admitted for

pulmonary fibrosis.  After evaluation found to have significant hypoxia.  

Discussion with the family ensued and the patient, has an established 

relationship at [facility] and we have agreed to transfer once bed 

is available.


Patient Condition at Discharge: Fair





Plan - Discharge Summary


Discharge Rx Participant: No


New Discharge Prescriptions: 


New


   Zolpidem [Ambien] 5 mg PO HS PRN  tab


     PRN Reason: Insomnia


   Ipratropium-Albuterol Nebulize [Duoneb 0.5 mg-3 mg/3 ml Soln] 3 ml INHALATION

RT-QID  ampul.neb


   Nitroglycerin Sl Tabs [Nitrostat] 0.4 mg SUBLINGUAL Q5M PRN  tab


     PRN Reason: Chest Pain


   ALPRAZolam [Xanax] 0.25 mg PO Q8H PRN  tab


     PRN Reason: Anxiety





Continue


   Atorvastatin [Lipitor] 40 mg PO HS


   sitaGLIPtin [Januvia] 100 mg PO DAILY


   Isosorbide Mononitrate [Imdur] 120 mg PO DAILY


   Glimepiride [Amaryl] 4 mg PO BID


   Clopidogrel [Plavix] 75 mg PO HS


   Aspirin 81 mg PO DAILY


   Ranolazine [Ranexa] 1,000 mg PO BID


   Fluticasone Nasal Spray [Flonase Nasal Spray] 2 spr EA NOSTRIL DAILY


   Midodrine [ProAmatine] 5 mg PO BID


   Natures Bounty Cinnimon Plus Chromium 2000mg 1 cap PO DAILY


Discharge Medication List





Aspirin 81 mg PO DAILY 08/05/16 [History]


Atorvastatin [Lipitor] 40 mg PO HS 08/05/16 [History]


Clopidogrel [Plavix] 75 mg PO HS 08/05/16 [History]


Glimepiride [Amaryl] 4 mg PO BID 08/05/16 [History]


Isosorbide Mononitrate [Imdur] 120 mg PO DAILY 08/05/16 [History]


Ranolazine [Ranexa] 1,000 mg PO BID 08/05/16 [History]


sitaGLIPtin [Januvia] 100 mg PO DAILY 08/05/16 [History]


Fluticasone Nasal Spray [Flonase Nasal Spray] 2 spr EA NOSTRIL DAILY 08/21/19 

[History]


Midodrine [ProAmatine] 5 mg PO BID 08/21/19 [History]


Natures Bounty Cinnimon Plus Chromium 2000mg 1 cap PO DAILY 08/21/19 [History]


ALPRAZolam [Xanax] 0.25 mg PO Q8H PRN  tab 08/23/19 [Rx]


Ipratropium-Albuterol Nebulize [Duoneb 0.5 mg-3 mg/3 ml Soln] 3 ml INHALATION 

RT-QID  ampul.neb 08/23/19 [Rx]


Nitroglycerin Sl Tabs [Nitrostat] 0.4 mg SUBLINGUAL Q5M PRN  tab 08/23/19 [Rx]


Zolpidem [Ambien] 5 mg PO HS PRN  tab 08/23/19 [Rx]








Follow up Appointment(s)/Referral(s): 


René Gong MD [Primary Care Provider] - 1 Week


Activity/Diet/Wound Care/Special Instructions: 


u of m transfer

## 2019-08-24 LAB
ANION GAP SERPL CALC-SCNC: 9 MMOL/L
BASOPHILS # BLD AUTO: 0 K/UL (ref 0–0.2)
BASOPHILS NFR BLD AUTO: 0 %
BUN SERPL-SCNC: 25 MG/DL (ref 9–20)
CALCIUM SPEC-MCNC: 9.1 MG/DL (ref 8.4–10.2)
CHLORIDE SERPL-SCNC: 104 MMOL/L (ref 98–107)
CO2 SERPL-SCNC: 27 MMOL/L (ref 22–30)
EOSINOPHIL # BLD AUTO: 0 K/UL (ref 0–0.7)
EOSINOPHIL NFR BLD AUTO: 0 %
ERYTHROCYTE [DISTWIDTH] IN BLOOD BY AUTOMATED COUNT: 4.52 M/UL (ref 4.3–5.9)
ERYTHROCYTE [DISTWIDTH] IN BLOOD: 14.4 % (ref 11.5–15.5)
GLUCOSE BLD-MCNC: 185 MG/DL (ref 75–99)
GLUCOSE BLD-MCNC: 203 MG/DL (ref 75–99)
GLUCOSE BLD-MCNC: 219 MG/DL (ref 75–99)
GLUCOSE BLD-MCNC: 220 MG/DL (ref 75–99)
GLUCOSE SERPL-MCNC: 227 MG/DL (ref 74–99)
HCT VFR BLD AUTO: 44.8 % (ref 39–53)
HGB BLD-MCNC: 14.3 GM/DL (ref 13–17.5)
LYMPHOCYTES # SPEC AUTO: 0.6 K/UL (ref 1–4.8)
LYMPHOCYTES NFR SPEC AUTO: 4 %
MCH RBC QN AUTO: 31.6 PG (ref 25–35)
MCHC RBC AUTO-ENTMCNC: 31.9 G/DL (ref 31–37)
MCV RBC AUTO: 99.1 FL (ref 80–100)
MONOCYTES # BLD AUTO: 0.5 K/UL (ref 0–1)
MONOCYTES NFR BLD AUTO: 3 %
NEUTROPHILS # BLD AUTO: 12.5 K/UL (ref 1.3–7.7)
NEUTROPHILS NFR BLD AUTO: 92 %
PLATELET # BLD AUTO: 150 K/UL (ref 150–450)
POTASSIUM SERPL-SCNC: 4.2 MMOL/L (ref 3.5–5.1)
SODIUM SERPL-SCNC: 140 MMOL/L (ref 137–145)
WBC # BLD AUTO: 13.6 K/UL (ref 3.8–10.6)

## 2019-08-24 RX ADMIN — METHYLPREDNISOLONE SODIUM SUCCINATE SCH MG: 125 INJECTION, POWDER, FOR SOLUTION INTRAMUSCULAR; INTRAVENOUS at 16:09

## 2019-08-24 RX ADMIN — MIDODRINE HYDROCHLORIDE SCH MG: 5 TABLET ORAL at 17:48

## 2019-08-24 RX ADMIN — CEFAZOLIN SCH: 330 INJECTION, POWDER, FOR SOLUTION INTRAMUSCULAR; INTRAVENOUS at 18:20

## 2019-08-24 RX ADMIN — LINAGLIPTIN SCH MG: 5 TABLET, FILM COATED ORAL at 07:53

## 2019-08-24 RX ADMIN — METHYLPREDNISOLONE SODIUM SUCCINATE SCH MG: 125 INJECTION, POWDER, FOR SOLUTION INTRAMUSCULAR; INTRAVENOUS at 07:54

## 2019-08-24 RX ADMIN — FLUTICASONE PROPIONATE SCH SPRAY: 50 SPRAY, METERED NASAL at 07:55

## 2019-08-24 RX ADMIN — IPRATROPIUM BROMIDE AND ALBUTEROL SULFATE SCH ML: .5; 3 SOLUTION RESPIRATORY (INHALATION) at 08:13

## 2019-08-24 RX ADMIN — INSULIN ASPART SCH UNIT: 100 INJECTION, SOLUTION INTRAVENOUS; SUBCUTANEOUS at 07:54

## 2019-08-24 RX ADMIN — METHYLPREDNISOLONE SODIUM SUCCINATE SCH MG: 125 INJECTION, POWDER, FOR SOLUTION INTRAMUSCULAR; INTRAVENOUS at 23:54

## 2019-08-24 RX ADMIN — BUDESONIDE SCH MG: 0.5 INHALANT ORAL at 08:13

## 2019-08-24 RX ADMIN — INSULIN ASPART SCH UNIT: 100 INJECTION, SOLUTION INTRAVENOUS; SUBCUTANEOUS at 12:39

## 2019-08-24 RX ADMIN — CLOPIDOGREL BISULFATE SCH MG: 75 TABLET ORAL at 21:05

## 2019-08-24 RX ADMIN — MIDODRINE HYDROCHLORIDE SCH MG: 5 TABLET ORAL at 07:53

## 2019-08-24 RX ADMIN — IPRATROPIUM BROMIDE AND ALBUTEROL SULFATE SCH ML: .5; 3 SOLUTION RESPIRATORY (INHALATION) at 11:29

## 2019-08-24 RX ADMIN — CEFAZOLIN SCH: 330 INJECTION, POWDER, FOR SOLUTION INTRAMUSCULAR; INTRAVENOUS at 04:47

## 2019-08-24 RX ADMIN — IPRATROPIUM BROMIDE AND ALBUTEROL SULFATE SCH ML: .5; 3 SOLUTION RESPIRATORY (INHALATION) at 20:12

## 2019-08-24 RX ADMIN — RANOLAZINE SCH MG: 500 TABLET, FILM COATED, EXTENDED RELEASE ORAL at 07:53

## 2019-08-24 RX ADMIN — GLIMEPIRIDE SCH MG: 4 TABLET ORAL at 07:54

## 2019-08-24 RX ADMIN — ATORVASTATIN CALCIUM SCH MG: 40 TABLET, FILM COATED ORAL at 21:05

## 2019-08-24 RX ADMIN — RANOLAZINE SCH MG: 500 TABLET, FILM COATED, EXTENDED RELEASE ORAL at 21:05

## 2019-08-24 RX ADMIN — BUDESONIDE SCH MG: 0.5 INHALANT ORAL at 20:12

## 2019-08-24 RX ADMIN — ISOSORBIDE MONONITRATE SCH MG: 60 TABLET, EXTENDED RELEASE ORAL at 07:53

## 2019-08-24 RX ADMIN — IPRATROPIUM BROMIDE AND ALBUTEROL SULFATE SCH ML: .5; 3 SOLUTION RESPIRATORY (INHALATION) at 15:58

## 2019-08-24 RX ADMIN — GLIMEPIRIDE SCH MG: 4 TABLET ORAL at 21:05

## 2019-08-24 RX ADMIN — INSULIN ASPART SCH UNIT: 100 INJECTION, SOLUTION INTRAVENOUS; SUBCUTANEOUS at 17:48

## 2019-08-24 RX ADMIN — INSULIN ASPART SCH UNIT: 100 INJECTION, SOLUTION INTRAVENOUS; SUBCUTANEOUS at 21:04

## 2019-08-24 RX ADMIN — ASPIRIN 81 MG CHEWABLE TABLET SCH MG: 81 TABLET CHEWABLE at 07:54

## 2019-08-24 NOTE — PN
PROGRESS NOTE



DATE OF SERVICE:

08/24/2019.



He continues to have shortness of breath.



PHYSICAL EXAMINATION:

Her vitals are stable.  He is afebrile.  Chest reveals bilateral Velcro type crackles

of the lower half.  Cardiovascular system reveals  S1, S2.  Abdomen is soft.  There is

no pedal edema.



Labs were reviewed.



IMPRESSION:

At this time:

1. Idiopathic pulmonary fibrosis with hypoxemia.

2. Acute on chronic respiratory failure with acute hypoxemia.

3. Continue IV steroids, insulin and agree with transfer to the [facility]

    where he will have access to his physicians.

He was counseled regarding his condition.  He would be a candidate for outpatient

pulmonary rehab and possibly Ofev.





MMODL / IJN: [id number] / Job#: [id number]

## 2019-08-24 NOTE — PN
PROGRESS NOTE



DATE OF SERVICE:

08/24/2019



I am covering for Dr. Gnog.



This 74-year-old gentleman who was admitted with shortness of breath also had a past

medical history of multiple medical problems including diabetes, DVT, history of

pulmonary fibrosis, CAD, CABG, and stent also.  The patient also been followed by

[facility] clinic at this time.  The [facility] was apparently

contacted and the response being awaited for possible transfer at this time.  Dr. ISAAC Hebert has seen the patient from the pulmonary point of view.  There is no history of

fever, rigors, chills at this time.



PAST MEDICAL HISTORY:

History of pulmonary fibrosis, history of DVT, sleep apnea.



MEDICATIONS:

Prior to admission include:

1. Ventolin 2.5 q.i.d. p.r.n.

2. DuoNeb q.i.d. and p.r.n.

3. Xanax 0.5 q.8 p.r.n.

4. Aspirin 81 mg p.o. daily.

5. Lipitor 40 mg p.o. q.h.s.

6. Pulmicort 0.5 b.i.d.

7. Plavix 75 mg p.o. q.h.s.

8. Flonase 2 sprays daily.

9. Amaryl 4 mg b.i.d. p.r.n.

10.NovoLog q.a.c. and q.h.s.

11.Imdur 20 mg p.o. daily.

12.Tradjenta 5 mg p.o. daily.

13.Solu-Medrol 60 IV q.8h.

14.ProAmatine 5 mg a.c. b.i.d.

15.Nitrostat 0.4 sublingual p.r.n.

16.Ranexa 1000 mg p.o. b.i.d.

17.Ambien 5 mg q.h.s. p.r.n.



PHYSICAL EXAM:

Patient is alert, oriented x3.  Pulse 75. Blood pressure is 130/69, respiration 17,

temperature 98.2, pulse ox 98% on 4 L.

HEENT:  Conjunctivae normal.  Oral mucosa moist.

NECK is no jugular venous distention.  No carotid bruit. No lymph node enlargement.

CARDIOVASCULAR:  S1, S2 muffled. No S3, no S4.

RESPIRATORY: Breath sounds diminished in the bases.  Bilateral scattered rhonchi and

crackles.  Expiratory wheezing also present.  Basal coarse crackles in the posterior

lungs also present.

ABDOMEN:  Soft, nontender.

LEGS:  No edema.  No swelling.

NERVOUS SYSTEM: Higher functions as  mentioned earlier. Moves all four limbs.  No focal

motor or sensory deficits.

Lymphatics: No lymph nodes palpable in the neck, axillae or groin.

SKIN:  No ulcer, rash or bleeding.

JOINTS:  No active deforming arthropathy.



LABS:

Accu-Cheks 185, 220.  WBC 13.6. Chest x-ray:  The most recent was personally reviewed

by me and CT scan of the chest showed extensive pulmonary interstitial fibrosis,

mediastinal adenopathy, some progression compared to the old scan was noted.

Mediastinal adenopathy is unchanged.  Extensive adhesions.  Atherosclerotic vascular

disease also noted.



ASSESSMENT:

1. Acute idiopathic pulmonary fibrosis, acute exacerbation, with acute hypoxic

    respiratory failure.

2. History of deep vein thrombosis.

3. Diabetes mellitus type 2.

4. Sleep apnea.

5. History of pulmonary hypertension.

6. History of coronary artery disease, coronary artery bypass grafting/stent.

7. Remote history of nicotine dependence.

8. NO CODE, NO CPR, NO VENT.



RECOMMENDATIONS AND DISCUSSION:

In this 74-year-old gentleman who presented with multiple complex medical issues, we

will monitor the patient closely, continue the current medications, management and

symptomatic treatment.  Optimize bronchodilator treatment.  Empiric steroids.  We will

closely monitor and closely follow with Pulmonary.   is working towards

contacting [facility] for possible transfer.  Further recommendations to

follow.





MMODL / IJN: [id number] / Job#: [id number]

## 2019-08-25 VITALS — RESPIRATION RATE: 18 BRPM

## 2019-08-25 LAB
GLUCOSE BLD-MCNC: 159 MG/DL (ref 75–99)
GLUCOSE BLD-MCNC: 171 MG/DL (ref 75–99)
GLUCOSE BLD-MCNC: 174 MG/DL (ref 75–99)
GLUCOSE BLD-MCNC: 240 MG/DL (ref 75–99)

## 2019-08-25 RX ADMIN — METHYLPREDNISOLONE SODIUM SUCCINATE SCH MG: 125 INJECTION, POWDER, FOR SOLUTION INTRAMUSCULAR; INTRAVENOUS at 16:56

## 2019-08-25 RX ADMIN — CLOPIDOGREL BISULFATE SCH MG: 75 TABLET ORAL at 21:18

## 2019-08-25 RX ADMIN — METHYLPREDNISOLONE SODIUM SUCCINATE SCH MG: 125 INJECTION, POWDER, FOR SOLUTION INTRAMUSCULAR; INTRAVENOUS at 08:31

## 2019-08-25 RX ADMIN — ASPIRIN 81 MG CHEWABLE TABLET SCH MG: 81 TABLET CHEWABLE at 08:31

## 2019-08-25 RX ADMIN — LINAGLIPTIN SCH MG: 5 TABLET, FILM COATED ORAL at 08:30

## 2019-08-25 RX ADMIN — MIDODRINE HYDROCHLORIDE SCH MG: 5 TABLET ORAL at 16:57

## 2019-08-25 RX ADMIN — GLIMEPIRIDE SCH MG: 4 TABLET ORAL at 08:31

## 2019-08-25 RX ADMIN — IPRATROPIUM BROMIDE AND ALBUTEROL SULFATE SCH ML: .5; 3 SOLUTION RESPIRATORY (INHALATION) at 08:38

## 2019-08-25 RX ADMIN — IPRATROPIUM BROMIDE AND ALBUTEROL SULFATE SCH ML: .5; 3 SOLUTION RESPIRATORY (INHALATION) at 16:25

## 2019-08-25 RX ADMIN — MIDODRINE HYDROCHLORIDE SCH MG: 5 TABLET ORAL at 08:30

## 2019-08-25 RX ADMIN — INSULIN ASPART SCH UNIT: 100 INJECTION, SOLUTION INTRAVENOUS; SUBCUTANEOUS at 17:32

## 2019-08-25 RX ADMIN — IPRATROPIUM BROMIDE AND ALBUTEROL SULFATE SCH ML: .5; 3 SOLUTION RESPIRATORY (INHALATION) at 12:27

## 2019-08-25 RX ADMIN — CEFAZOLIN SCH MLS/HR: 330 INJECTION, POWDER, FOR SOLUTION INTRAMUSCULAR; INTRAVENOUS at 03:13

## 2019-08-25 RX ADMIN — ISOSORBIDE MONONITRATE SCH MG: 60 TABLET, EXTENDED RELEASE ORAL at 08:30

## 2019-08-25 RX ADMIN — BUDESONIDE SCH MG: 0.5 INHALANT ORAL at 20:52

## 2019-08-25 RX ADMIN — FLUTICASONE PROPIONATE SCH SPRAY: 50 SPRAY, METERED NASAL at 08:32

## 2019-08-25 RX ADMIN — INSULIN ASPART SCH UNIT: 100 INJECTION, SOLUTION INTRAVENOUS; SUBCUTANEOUS at 12:41

## 2019-08-25 RX ADMIN — BUDESONIDE SCH MG: 0.5 INHALANT ORAL at 08:46

## 2019-08-25 RX ADMIN — ATORVASTATIN CALCIUM SCH MG: 40 TABLET, FILM COATED ORAL at 21:18

## 2019-08-25 RX ADMIN — INSULIN ASPART SCH UNIT: 100 INJECTION, SOLUTION INTRAVENOUS; SUBCUTANEOUS at 21:18

## 2019-08-25 RX ADMIN — RANOLAZINE SCH MG: 500 TABLET, FILM COATED, EXTENDED RELEASE ORAL at 08:30

## 2019-08-25 RX ADMIN — CEFAZOLIN SCH MLS/HR: 330 INJECTION, POWDER, FOR SOLUTION INTRAMUSCULAR; INTRAVENOUS at 14:53

## 2019-08-25 RX ADMIN — CEFAZOLIN SCH: 330 INJECTION, POWDER, FOR SOLUTION INTRAMUSCULAR; INTRAVENOUS at 12:37

## 2019-08-25 RX ADMIN — GLIMEPIRIDE SCH MG: 4 TABLET ORAL at 21:18

## 2019-08-25 RX ADMIN — IPRATROPIUM BROMIDE AND ALBUTEROL SULFATE SCH ML: .5; 3 SOLUTION RESPIRATORY (INHALATION) at 20:52

## 2019-08-25 RX ADMIN — INSULIN ASPART SCH UNIT: 100 INJECTION, SOLUTION INTRAVENOUS; SUBCUTANEOUS at 08:31

## 2019-08-25 RX ADMIN — RANOLAZINE SCH MG: 500 TABLET, FILM COATED, EXTENDED RELEASE ORAL at 21:18

## 2019-08-25 NOTE — PN
PROGRESS NOTE



DATE OF SERVICE:

08/25/2019



I am covering for Dr. Gong.



This 74-year-old gentleman with a past medical history of multiple medical problems

admitted with pulmonary fibrosis acute exacerbation.  Patient is severely hypoxemic.  A

transfer to [facility] is being evaluated by the case management team.  No

chest pain.  No palpitation.



EXAM:

Alert and oriented x3.  Pulse is 80, blood pressure is 170/64, respiration 16,

temperature 98.1.  Pulse ox 100 percent on room air.

HEENT: Conjunctivae normal.

NECK: No jugular venous distention.

CARDIOVASCULAR: S1, S2 muffled.

RESPIRATORY: Breath sounds diminished in the bases, bilateral scattered rhonchi and

basal crackles.

ABDOMEN is soft.  Nontender.

NERVOUS SYSTEM: No focal deficits.



LABS:

WBC 13.6, Accu-Cheks are 222, 203, 219.



ASSESSMENT:

1. Acute idiopathic pulmonary fibrosis acute exacerbation with acute hypoxic

    respiratory failure.

2. History of deep vein thrombosis.

3. Diabetes type 2.

4. Sleep apnea.

5. History of pulmonary hypertension.

6. History of coronary artery disease/CABG and stent.

7. Remote history of nicotine dependence.

8. NO CODE, NO CPR, NO VENT.



RECOMMENDATIONS AND DISCUSSION:

I recommend to continue current medications, symptomatic treatment.  Otherwise, at this

time, I would continue the rest of the medications, bronchodilators.  Closely follow

with Pulmonary.  Dr. Gong will follow tomorrow and further recommendations to follow.





MMLACIEL / DONN: [id number] / Job#: [id number]

## 2019-08-26 VITALS — HEART RATE: 82 BPM

## 2019-08-26 VITALS — SYSTOLIC BLOOD PRESSURE: 119 MMHG | DIASTOLIC BLOOD PRESSURE: 68 MMHG | TEMPERATURE: 97.6 F

## 2019-08-26 LAB
GLUCOSE BLD-MCNC: 155 MG/DL (ref 75–99)
GLUCOSE BLD-MCNC: 214 MG/DL (ref 75–99)
GLUCOSE BLD-MCNC: 219 MG/DL (ref 75–99)

## 2019-08-26 RX ADMIN — IPRATROPIUM BROMIDE AND ALBUTEROL SULFATE SCH ML: .5; 3 SOLUTION RESPIRATORY (INHALATION) at 12:24

## 2019-08-26 RX ADMIN — LINAGLIPTIN SCH MG: 5 TABLET, FILM COATED ORAL at 07:22

## 2019-08-26 RX ADMIN — METHYLPREDNISOLONE SODIUM SUCCINATE SCH: 125 INJECTION, POWDER, FOR SOLUTION INTRAMUSCULAR; INTRAVENOUS at 17:18

## 2019-08-26 RX ADMIN — INSULIN ASPART SCH UNIT: 100 INJECTION, SOLUTION INTRAVENOUS; SUBCUTANEOUS at 17:43

## 2019-08-26 RX ADMIN — ASPIRIN 81 MG CHEWABLE TABLET SCH MG: 81 TABLET CHEWABLE at 07:22

## 2019-08-26 RX ADMIN — METHYLPREDNISOLONE SODIUM SUCCINATE SCH MG: 125 INJECTION, POWDER, FOR SOLUTION INTRAMUSCULAR; INTRAVENOUS at 00:18

## 2019-08-26 RX ADMIN — BUDESONIDE SCH MG: 0.5 INHALANT ORAL at 09:14

## 2019-08-26 RX ADMIN — ISOSORBIDE MONONITRATE SCH MG: 60 TABLET, EXTENDED RELEASE ORAL at 07:22

## 2019-08-26 RX ADMIN — GLIMEPIRIDE SCH MG: 4 TABLET ORAL at 07:22

## 2019-08-26 RX ADMIN — MIDODRINE HYDROCHLORIDE SCH MG: 5 TABLET ORAL at 07:22

## 2019-08-26 RX ADMIN — INSULIN ASPART SCH UNIT: 100 INJECTION, SOLUTION INTRAVENOUS; SUBCUTANEOUS at 07:29

## 2019-08-26 RX ADMIN — IPRATROPIUM BROMIDE AND ALBUTEROL SULFATE SCH ML: .5; 3 SOLUTION RESPIRATORY (INHALATION) at 09:14

## 2019-08-26 RX ADMIN — INSULIN ASPART SCH UNIT: 100 INJECTION, SOLUTION INTRAVENOUS; SUBCUTANEOUS at 13:02

## 2019-08-26 RX ADMIN — CEFAZOLIN SCH: 330 INJECTION, POWDER, FOR SOLUTION INTRAMUSCULAR; INTRAVENOUS at 10:15

## 2019-08-26 RX ADMIN — METHYLPREDNISOLONE SODIUM SUCCINATE SCH MG: 125 INJECTION, POWDER, FOR SOLUTION INTRAMUSCULAR; INTRAVENOUS at 07:21

## 2019-08-26 RX ADMIN — RANOLAZINE SCH MG: 500 TABLET, FILM COATED, EXTENDED RELEASE ORAL at 07:22

## 2019-08-26 RX ADMIN — MIDODRINE HYDROCHLORIDE SCH MG: 5 TABLET ORAL at 17:43

## 2019-08-26 RX ADMIN — FLUTICASONE PROPIONATE SCH SPRAY: 50 SPRAY, METERED NASAL at 10:15

## 2019-08-26 RX ADMIN — IPRATROPIUM BROMIDE AND ALBUTEROL SULFATE SCH ML: .5; 3 SOLUTION RESPIRATORY (INHALATION) at 15:54

## 2019-08-26 NOTE — PN
PROGRESS NOTE



DATE OF SERVICE:

August 25, 2019.



He continues to have shortness of breath.



PHYSICAL EXAMINATION:

His vitals are stable.  He is afebrile. His chest reveals Velcro type crackles.

Cardiovascular system reveals an S1, S2.  Abdomen is soft.  There is no edema.



IMPRESSION/PLAN:

At this time:

1. Lung fibrosis consistent with idiopathic pulmonary fibrosis.

2. Continue IV steroids and aerosolized steroids.

3. Possible chronic obstructive pulmonary disease .  Continue bronchodilators.

4. Diabetes mellitus.  Continue insulin.

5. Increase his activity level.

6. Await transfer to tertiary care center for further evaluation.





MMODL / IJN: [id number] / Job#: [id number]

## 2019-08-26 NOTE — P.PN
Subjective


Progress Note Date: 08/26/19





Idiopathic pulmonary fibrosis for acute care.  The patient has requested 

transfer to [facility] however, there is no bed available at this 

time.  Discharge planning will work on the case and if he can be transferred 

today we will try to get him there.  Otherwise, we will transfer home as he has 

of implementation for oxygen at home.





Objective





- Vital Signs


Vital signs: 


                                   Vital Signs











Temp  97.6 F   08/26/19 04:44


 


Pulse  61   08/26/19 04:44


 


Resp  18   08/26/19 04:44


 


BP  119/68   08/26/19 04:44


 


Pulse Ox  96   08/26/19 04:44








                                 Intake & Output











 08/25/19 08/26/19 08/26/19





 18:59 06:59 18:59


 


Intake Total 1780 600 


 


Output Total 400 600 


 


Balance 1380 0 


 


Intake:   


 


  Intake, IV Titration 600 600 





  Amount   


 


    Sodium Chloride 0.9% 1, 600 600 





    000 ml @ 100 mls/hr IV .   





    Q10H [facility] Rx#:[id number]   


 


  Oral 1180  


 


Output:   


 


  Urine 400 600 


 


Other:   


 


  Voiding Method Urinal Urinal 


 


  # Voids 3  


 


  # Bowel Movements 1  














- Constitutional


General appearance: Present: average body habitus





- EENT


Eyes: Absent: PERRLA


Ears: right: normal





- Respiratory


Respiratory: bilateral: diminished





- Cardiovascular


Rhythm: regular


Heart sounds: normal: S1, S2


Abnormal Heart Sounds: Absent: S3 Gallop





- Gastrointestinal


General gastrointestinal: Present: soft





- Integumentary


Integumentary: Absent: ulcer





- Neurologic


Neurologic: Present: CNII-XII intact





- Labs


CBC & Chem 7: 


                                 08/24/19 11:21





                                 08/24/19 11:21


Labs: 


                  Abnormal Lab Results - Last 24 Hours (Table)











  08/25/19 08/25/19 08/25/19 Range/Units





  11:39 17:15 19:55 


 


POC Glucose (mg/dL)  174 H  159 H  240 H  (75-99)  mg/dL














  08/26/19 Range/Units





  07:03 


 


POC Glucose (mg/dL)  155 H  (75-99)  mg/dL














Assessment and Plan


(1) Diabetes


Current Visit: Yes   Status: Acute   Code(s): E11.9 - TYPE 2 DIABETES MELLITUS 

WITHOUT COMPLICATIONS   SNOMED Code(s): 41545837


   





(2) CAD (coronary artery disease)


Current Visit: Yes   Status: Acute   Code(s): I25.10 - ATHSCL HEART DISEASE OF 

NATIVE CORONARY ARTERY W/O ANG PCTRS   SNOMED Code(s): 56953793


   





(3) Acute exacerbation of chronic obstructive airways disease


Current Visit: Yes   Status: Acute   Code(s): J44.1 - CHRONIC OBSTRUCTIVE 

PULMONARY DISEASE W (ACUTE) EXACERBATION   SNOMED Code(s): 770050876


   





(4) Pulmonary fibrosis


Current Visit: Yes   Status: Acute   Code(s): J84.10 - PULMONARY FIBROSIS, UNS

PECIFIED   SNOMED Code(s): 38742667


   


Plan: 





The patient hopefully will transfer today.





Otherwise, I will discharged home pending referral back to [facility].





Prognosis is guarded.

## 2019-08-26 NOTE — PN
PROGRESS NOTE



DATE OF SERVICE:

August 26, 2019.



He had been hemodynamically stable.  He was at his baseline as far as shortness of

breath goes.



PHYSICAL EXAMINATION:

On physical examination, his respiratory rate is 18, pulse rate 84, O2 saturation on 4

L by nasal cannula is 95%.  HEENT is unremarkable.  Chest reveals scattered crackles in

the bases.  Cardiovascular system:  S1, S2.  Abdomen is soft.  There is no edema.



IMPRESSION:

At this time is:

1. Idiopathic pulmonary fibrosis with an acute exacerbation.

2. Possible chronic obstructive pulmonary disease.

3. Diabetes mellitus.

Agree with discharge planning with close outpatient followup.  Increase his activity

level.  He would be a candidate for pulmonary rehab and possibly for Ofev as well.  He

was counseled regarding his condition and this approach.





ROHIT / ALEX: [id number] / Job#: [id number]

## 2019-08-30 ENCOUNTER — HOSPITAL ENCOUNTER (OUTPATIENT)
Dept: HOSPITAL 47 - CPPFTMAIN | Age: 74
Discharge: HOME | End: 2019-08-30
Attending: INTERNAL MEDICINE
Payer: MEDICARE

## 2019-08-30 VITALS — HEART RATE: 93 BPM

## 2019-08-30 DIAGNOSIS — J84.112: Primary | ICD-10-CM

## 2019-08-30 PROCEDURE — 94618 PULMONARY STRESS TESTING: CPT
